# Patient Record
Sex: MALE | URBAN - NONMETROPOLITAN AREA
[De-identification: names, ages, dates, MRNs, and addresses within clinical notes are randomized per-mention and may not be internally consistent; named-entity substitution may affect disease eponyms.]

---

## 2022-08-31 ENCOUNTER — OFFICE VISIT (OUTPATIENT)
Dept: URBAN - NONMETROPOLITAN AREA CLINIC 1 | Facility: CLINIC | Age: 47
End: 2022-08-31
Payer: MEDICARE

## 2022-08-31 DIAGNOSIS — D31.31 BENIGN NEOPLASM OF RIGHT CHOROID: ICD-10-CM

## 2022-08-31 DIAGNOSIS — H43.813 VITREOUS DEGENERATION, BILATERAL: ICD-10-CM

## 2022-08-31 DIAGNOSIS — E11.9 TYPE 2 DIABETES MELLITUS WITHOUT COMPLICATIONS: Primary | ICD-10-CM

## 2022-08-31 PROCEDURE — 99204 OFFICE O/P NEW MOD 45 MIN: CPT | Performed by: OPTOMETRIST

## 2022-08-31 PROCEDURE — 92250 FUNDUS PHOTOGRAPHY W/I&R: CPT | Performed by: OPTOMETRIST

## 2022-08-31 PROCEDURE — 92082 INTERMEDIATE VISUAL FIELD XM: CPT | Performed by: OPTOMETRIST

## 2022-08-31 ASSESSMENT — INTRAOCULAR PRESSURE
OD: 16
OS: 19

## 2022-08-31 NOTE — IMPRESSION/PLAN
Impression: Benign neoplasm of right choroid: D31.31. Plan: Educated pt on exam findings. Educated pt on freckle in back of eye, and likely long standing. Recommended Monitor yearly. Educated pt RTC asap if changes in vision noted prior.

## 2022-08-31 NOTE — IMPRESSION/PLAN
Impression: Vitreous degeneration, bilateral: H43.813. Plan: Floaters without retinal pathology. Discussed visual changes may also be related to blood pressure fluctuations. Warning signs of retinal tear (RT) and retinal detachment (RD) discussed with patient. Pt. instructed to contact our office ASAP if loss of vision, any worsening of symptoms, or changes consistent with RT or RD.

## 2022-08-31 NOTE — IMPRESSION/PLAN
Impression: Type 2 diabetes mellitus without complications: U00.3. Plan: Educated pt on importance of tight diabetic (A1C target: <6.5%), blood pressure (goal: <130/80), and cholesterol control to decrease risk of retinopathy progression and further vision loss. Recommended pt continuing taking all medications as directed and follow up with PCP. Pt expressed understanding. RTC 1 year for diabetic eye exam, or sooner if changes in vision or ocular comfort occur prior.